# Patient Record
Sex: MALE | Race: WHITE | Employment: FULL TIME | URBAN - METROPOLITAN AREA
[De-identification: names, ages, dates, MRNs, and addresses within clinical notes are randomized per-mention and may not be internally consistent; named-entity substitution may affect disease eponyms.]

---

## 2017-07-07 ENCOUNTER — APPOINTMENT (OUTPATIENT)
Dept: GENERAL RADIOLOGY | Age: 52
End: 2017-07-07
Attending: PHYSICIAN ASSISTANT
Payer: COMMERCIAL

## 2017-07-07 ENCOUNTER — HOSPITAL ENCOUNTER (EMERGENCY)
Age: 52
Discharge: HOME | End: 2017-07-07
Attending: EMERGENCY MEDICINE
Payer: COMMERCIAL

## 2017-07-07 VITALS
OXYGEN SATURATION: 97 % | HEIGHT: 70 IN | DIASTOLIC BLOOD PRESSURE: 81 MMHG | HEART RATE: 76 BPM | SYSTOLIC BLOOD PRESSURE: 111 MMHG | BODY MASS INDEX: 24.34 KG/M2 | TEMPERATURE: 97.4 F | WEIGHT: 170 LBS | RESPIRATION RATE: 15 BRPM

## 2017-07-07 DIAGNOSIS — R55 VASOVAGAL NEAR SYNCOPE: ICD-10-CM

## 2017-07-07 DIAGNOSIS — S92.002A: Primary | ICD-10-CM

## 2017-07-07 PROCEDURE — 96374 THER/PROPH/DIAG INJ IV PUSH: CPT

## 2017-07-07 PROCEDURE — 96361 HYDRATE IV INFUSION ADD-ON: CPT

## 2017-07-07 PROCEDURE — 74011250637 HC RX REV CODE- 250/637: Performed by: PHYSICIAN ASSISTANT

## 2017-07-07 PROCEDURE — 96376 TX/PRO/DX INJ SAME DRUG ADON: CPT

## 2017-07-07 PROCEDURE — 96375 TX/PRO/DX INJ NEW DRUG ADDON: CPT

## 2017-07-07 PROCEDURE — 73650 X-RAY EXAM OF HEEL: CPT

## 2017-07-07 PROCEDURE — 99285 EMERGENCY DEPT VISIT HI MDM: CPT

## 2017-07-07 PROCEDURE — 74011250636 HC RX REV CODE- 250/636: Performed by: PHYSICIAN ASSISTANT

## 2017-07-07 PROCEDURE — 75810000053 HC SPLINT APPLICATION

## 2017-07-07 RX ORDER — OXYCODONE AND ACETAMINOPHEN 5; 325 MG/1; MG/1
1 TABLET ORAL
Status: DISCONTINUED | OUTPATIENT
Start: 2017-07-07 | End: 2017-07-07

## 2017-07-07 RX ORDER — MORPHINE SULFATE 4 MG/ML
4 INJECTION, SOLUTION INTRAMUSCULAR; INTRAVENOUS
Status: COMPLETED | OUTPATIENT
Start: 2017-07-07 | End: 2017-07-07

## 2017-07-07 RX ORDER — SODIUM CHLORIDE 0.9 % (FLUSH) 0.9 %
5-10 SYRINGE (ML) INJECTION EVERY 8 HOURS
Status: DISCONTINUED | OUTPATIENT
Start: 2017-07-07 | End: 2017-07-08 | Stop reason: HOSPADM

## 2017-07-07 RX ORDER — ONDANSETRON 4 MG/1
4 TABLET, ORALLY DISINTEGRATING ORAL
Qty: 10 TAB | Refills: 0 | Status: SHIPPED | OUTPATIENT
Start: 2017-07-07

## 2017-07-07 RX ORDER — SODIUM CHLORIDE 0.9 % (FLUSH) 0.9 %
5-10 SYRINGE (ML) INJECTION AS NEEDED
Status: DISCONTINUED | OUTPATIENT
Start: 2017-07-07 | End: 2017-07-08 | Stop reason: HOSPADM

## 2017-07-07 RX ORDER — OXYCODONE AND ACETAMINOPHEN 5; 325 MG/1; MG/1
1 TABLET ORAL
Qty: 20 TAB | Refills: 0 | Status: SHIPPED | OUTPATIENT
Start: 2017-07-07 | End: 2017-07-12

## 2017-07-07 RX ORDER — KETOROLAC TROMETHAMINE 30 MG/ML
30 INJECTION, SOLUTION INTRAMUSCULAR; INTRAVENOUS
Status: COMPLETED | OUTPATIENT
Start: 2017-07-07 | End: 2017-07-07

## 2017-07-07 RX ORDER — ONDANSETRON 2 MG/ML
4 INJECTION INTRAMUSCULAR; INTRAVENOUS
Status: COMPLETED | OUTPATIENT
Start: 2017-07-07 | End: 2017-07-07

## 2017-07-07 RX ADMIN — MORPHINE SULFATE 4 MG: 4 INJECTION, SOLUTION INTRAMUSCULAR; INTRAVENOUS at 20:15

## 2017-07-07 RX ADMIN — ONDANSETRON 4 MG: 2 INJECTION INTRAMUSCULAR; INTRAVENOUS at 18:36

## 2017-07-07 RX ADMIN — OXYCODONE HYDROCHLORIDE AND ACETAMINOPHEN 1 TABLET: 5; 325 TABLET ORAL at 18:36

## 2017-07-07 RX ADMIN — SODIUM CHLORIDE 1000 ML: 900 INJECTION, SOLUTION INTRAVENOUS at 18:53

## 2017-07-07 RX ADMIN — MORPHINE SULFATE 4 MG: 4 INJECTION, SOLUTION INTRAMUSCULAR; INTRAVENOUS at 18:53

## 2017-07-07 RX ADMIN — KETOROLAC TROMETHAMINE 30 MG: 30 INJECTION, SOLUTION INTRAMUSCULAR at 20:14

## 2017-07-07 RX ADMIN — ONDANSETRON 4 MG: 2 INJECTION INTRAMUSCULAR; INTRAVENOUS at 20:14

## 2017-07-07 NOTE — ED PROVIDER NOTES
HPI Comments: Marylen Baller is a 50 y.o. male presenting ambulatory to the ED with c/o sudden onset of constant left heel pain s/p fall x 30 minutes PTA. Pt states he got back from a camping trip today and was putting camping equipment away when he fell off of a 6 ft ladder. He notes he landed on his left heel onto a concrete floor. Pt reports he has a low tolerance for pain causing him to frequently pass out. Pt and spouse report he has come close several times since the fall to passing out from the pain. Patient denies passing out on top of the ladder, reports he just lost his balance. Pt denies chest pain, SOB, head injury, neck pain, back pain, abdominal pain, numbness, hx of injuring his left ankle, or tobacco use. Pt denies following an orthopedist.     PCP: Alberto Monzon MD      There are no other complaints, changes or physical findings at this time. Written by DARIUS Higuera, as dictated by Kerry New PA-C. The history is provided by the patient. No  was used. No past medical history on file. No past surgical history on file. No family history on file. Social History     Social History    Marital status: N/A     Spouse name: N/A    Number of children: N/A    Years of education: N/A     Occupational History    Not on file. Social History Main Topics    Smoking status: Not on file    Smokeless tobacco: Not on file    Alcohol use Not on file    Drug use: Not on file    Sexual activity: Not on file     Other Topics Concern    Not on file     Social History Narrative         ALLERGIES: Review of patient's allergies indicates no known allergies. Review of Systems   Constitutional: Negative. Negative for chills and fever. HENT: Negative. Negative for rhinorrhea and sore throat. Eyes: Negative. Negative for visual disturbance. Respiratory: Negative. Negative for cough, chest tightness, shortness of breath and wheezing. Cardiovascular: Negative. Negative for chest pain and palpitations. Gastrointestinal: Negative. Negative for abdominal pain, constipation, diarrhea, nausea and vomiting. Genitourinary: Negative. Negative for dysuria and hematuria. Musculoskeletal: Positive for arthralgias (left ankle). Negative for myalgias. Skin: Negative. Negative for rash. Allergic/Immunologic: Negative. Negative for environmental allergies and food allergies. Neurological: Negative. Negative for headaches. Psychiatric/Behavioral: Negative. Negative for suicidal ideas. Patient Vitals for the past 12 hrs:   Temp Pulse Resp BP SpO2   07/07/17 1930 - 63 14 94/76 98 %   07/07/17 1855 - (!) 55 16 114/69 100 %   07/07/17 1800 - 61 17 116/64 96 %   07/07/17 1747 97.4 °F (36.3 °C) (!) 51 16 116/64 96 %            Physical Exam   Constitutional: He is oriented to person, place, and time. He appears well-developed and well-nourished. No distress. Pt is a  M, awake and alert in mild distress secondary to pain. HENT:   Head: Normocephalic and atraumatic. Right Ear: Tympanic membrane, external ear and ear canal normal.   Left Ear: Tympanic membrane, external ear and ear canal normal.   Nose: Nose normal.   Mouth/Throat: Uvula is midline, oropharynx is clear and moist and mucous membranes are normal.   Eyes: Conjunctivae and EOM are normal. Pupils are equal, round, and reactive to light. Right eye exhibits no discharge. Left eye exhibits no discharge. Neck: Normal range of motion. Cardiovascular: Normal rate, normal heart sounds and intact distal pulses. Pulses:       Dorsalis pedis pulses are 2+ on the right side, and 2+ on the left side. Pulmonary/Chest: Effort normal and breath sounds normal. No respiratory distress. He has no wheezes. He has no rales. He exhibits no tenderness. Abdominal: Soft. Bowel sounds are normal. There is no tenderness. There is no guarding. No CVA tenderness b/l. Musculoskeletal: He exhibits tenderness. He exhibits no edema or deformity. Left ankle: Tenderness. L ankle: No edema, erythema, or obvious bony deformity. No TTP. ROM intact. L foot: No edema, erythema, or obvious bony deformity. + TTP over calcaneous. Neuro and sensation intact. 2+ D.P. Pulses b/l. Lymphadenopathy:     He has no cervical adenopathy. Neurological: He is alert and oriented to person, place, and time. No cranial nerve deficit. No focal neuro deficits. Skin: Skin is warm. Laceration noted. No rash noted. He is diaphoretic. No erythema. multiple superficial lacerations to bilateral lower extremities   Psychiatric: He has a normal mood and affect. His behavior is normal.   Nursing note and vitals reviewed. MDM  Number of Diagnoses or Management Options  Closed fracture of left calcaneus, unspecified portion of calcaneus, initial encounter:   Diagnosis management comments:     DDx: fracture, sprain, strain, vasovagal episode secondary to pain    Patient denies low back pain and declines imagining of low back. Amount and/or Complexity of Data Reviewed  Tests in the radiology section of CPT®: ordered and reviewed  Discussion of test results with the performing providers: yes (Ortho)  Discuss the patient with other providers: yes (Orthopedics )    Patient Progress  Patient progress: stable    ED Course       Procedures    Progress Note:  5:59 PM  Pt denies low back pain and declined XR of lumbar spine. Written by DARIUS Rangel, as dictated by Dorothy Roberts PA-C.    CONSULT NOTE:   7:52 PM  Dorothy Roberts PA-C spoke with WANDA Dickey,   Specialty: Ortho  Discussed pt's hx, disposition, and available diagnostic and imaging results. Reviewed care plans. Consultant states to place a well padded posterior splint. Inform the pt to be non weight bearing, ice and elevate his ankle, and follow up with [employer].    Written by DARIUS Rangel, as dictated by Johnny Kuhn PA-C. Procedure Note - Splint Placement:  8:10 PM  Performed by: Johnny Kuhn PA-C  Neurovascularly intact prior to tx. An Orthoglass well padded posterior splint was placed on pt's left ankle. Joint was placed in flexion. Neurovascularly intact after tx. The procedure took 1-15 minutes, and pt tolerated well. Written by Joyce Rucker ED Scribe, as dictated by Johnny Kuhn PA-C.    8:48 PM  Provider re-evaluated pt. Per patient, pain has improved. Pt no longer diaphoretic. Provider discussed all available diagnostics, diagnosis, and treatment plan. Thoroughly discussed worrisome signs/symptoms in which pt should immediately return to ED, otherwise follow up with ortho. Patient conveys understanding and agreement to all of the above. All patient's questions were answered by provider. WANDA Salter      IMAGING RESULTS:  EXAM:  XR CALCANEUS LT     INDICATION:   Trauma x PTA.     COMPARISON: None.     FINDINGS: Lateral and calcaneal views of the left os calcis demonstrate  comminuted fracture. There is mild displacement.     IMPRESSION  IMPRESSION: Comminuted fracture of the left calcaneus.       MEDICATIONS GIVEN:  Medications   sodium chloride (NS) flush 5-10 mL (not administered)   sodium chloride (NS) flush 5-10 mL (not administered)   morphine injection 4 mg (not administered)   ondansetron (ZOFRAN) injection 4 mg (not administered)   sodium chloride 0.9 % bolus infusion 500 mL (not administered)   ketorolac (TORADOL) injection 30 mg (not administered)   morphine injection 4 mg (4 mg IntraVENous Given 7/7/17 1853)   ondansetron (ZOFRAN) injection 4 mg (4 mg IntraVENous Given 7/7/17 1836)   sodium chloride 0.9 % bolus infusion 1,000 mL (1,000 mL IntraVENous New Bag 7/7/17 1853)       IMPRESSION:  1. Closed fracture of left calcaneus, unspecified portion of calcaneus, initial encounter    2. Vasovagal near syncope        PLAN:  1.    Current Discharge Medication List START taking these medications    Details   oxyCODONE-acetaminophen (PERCOCET) 5-325 mg per tablet Take 1 Tab by mouth every four (4) hours as needed for Pain for up to 5 days. Max Daily Amount: 6 Tabs. Qty: 20 Tab, Refills: 0           2. Follow-up Information     Follow up With Details Comments [address] [facility], DO Schedule an appointment as soon as possible for a visit in 2 days  [place] [address]  [phone]      [facility] EMERGENCY DEPT  As needed or, If symptoms worsen [address]  [address] [facility]  [phone]        3. No weight bearing, Rest, elevation, and ice  Return to ED if worse     DISCHARGE NOTE  8:15 PM  The patient has been re-evaluated and is ready for discharge. Reviewed available results with patient. Counseled pt on diagnosis and care plan. Pt has expressed understanding, and all questions have been answered. Pt agrees with plan and agrees to F/U as recommended, or return to the ED if their sxs worsen. Discharge instructions have been provided and explained to the pt, along with reasons to return to the ED. Written by Roro Noe, ED Scribe, as dictated by Walker Duque PA-C. This note is prepared by Roro Noe, acting as Scribe for Walker Duque PA-C. Walker Duque PA-C : The scribe's documentation has been prepared under my direction and personally reviewed by me in its entirety. I confirm that the note above accurately reflects all work, treatment, procedures, and medical decision making performed by me.

## 2017-07-07 NOTE — DISCHARGE INSTRUCTIONS
Broken Foot: Care Instructions  Your Care Instructions    A broken foot, or foot fracture, is a break in one or more of the bones in your foot. It may happen because of a sports injury, a fall, or other accident. A compound, or open, fracture occurs when a bone breaks through the skin. A break that does not poke through the skin is a closed fracture. Your treatment depends on the location and type of break in your foot. You may need a splint, a cast, or an orthopedic shoe. Certain kinds of injuries may need surgery at some time. Whatever your treatment, you can ease symptoms and help your foot heal with care at home. You may need 6 to 8 weeks or more to fully heal.  You heal best when you take good care of yourself. Eat a variety of healthy foods, and don't smoke. Follow-up care is a key part of your treatment and safety. Be sure to make and go to all appointments, and call your doctor if you are having problems. It's also a good idea to know your test results and keep a list of the medicines you take. How can you care for yourself at home? · Be safe with medicines. Take pain medicines exactly as directed. ¨ If the doctor gave you a prescription medicine for pain, take it as prescribed. ¨ If you are not taking a prescription pain medicine, ask your doctor if you can take an over-the-counter medicine. · Leave the splint on until your follow-up appointment. Do not put any weight on the injured foot. If you were given crutches, use them as directed. · Put ice or a cold pack on your foot for 10 to 20 minutes at a time. Try to do this every 1 to 2 hours for the next 3 days (when you are awake) or until the swelling goes down. Put a thin cloth between the ice and your skin. · Prop up the sore foot on a pillow anytime you sit or lie down during the next 3 days. Try to keep it above the level of your heart. This will help reduce swelling. · Follow the cast care instructions your doctor gives you.  If you have a splint, do not take it off unless your doctor tells you to. Cast and splint care  · If you have a removable splint, ask your doctor if it is okay to remove it to bathe. Your doctor may want you to keep it on as much as possible. · Keep your plaster splint covered by taping a sheet of plastic around it when you bathe. Water under the plaster can cause your skin to itch and hurt. · Never cut your splint. When should you call for help? Call 911 anytime you think you may need emergency care. For example, call if:  · You have sudden chest pain and shortness of breath, or you cough up blood. Call your doctor now or seek immediate medical care if:  · You have increased or severe pain. · Your toes are cool or pale or change color. · You have tingling, weakness, or numbness in your foot. · Your cast or splint feels too tight. · You cannot move your toes. · You have signs of a blood clot, such as:  ¨ Pain in your calf, back of the knee, thigh, or groin. ¨ Redness or swelling in your leg or groin. Watch closely for changes in your health, and be sure to contact your doctor if:  · Your pain is not better in 2 to 3 days. Where can you learn more? Go to http://donald-ezequiel.info/. Enter N804 in the search box to learn more about \"Broken Foot: Care Instructions. \"  Current as of: March 21, 2017  Content Version: 11.3  © 2626-6390 [employer]. Care instructions adapted under license by [employer] (which disclaims liability or warranty for this information). If you have questions about a medical condition or this instruction, always ask your healthcare professional. [place] [address] any warranty or liability for your use of this information.

## 2017-07-07 NOTE — ED NOTES
Assumed care of patient from [address] [place] [address]. Patient resting comfortably in no apparent distress. On monitor x 3. Call bell within reach. Plan of care discussed.

## 2017-07-07 NOTE — ED NOTES
While triaging patient states \" I feel like I'm going to pass out. I pass out easily\". Approximately two to three second syncopal episode noted. Patient diaphoretic and alert and oriented after episode.

## 2017-07-07 NOTE — ED NOTES
Patient arrives ambulatory to the ER with wife after falling off a ladder.   Patient c/o L ankle pain

## 2017-07-08 NOTE — ED NOTES
Discharge instructions reviewed with pt and copy given along with RX by WANDA Ceja. All questions answered at this time. Pt discharged via wheelchair.

## 2017-07-10 ENCOUNTER — HOSPITAL ENCOUNTER (OUTPATIENT)
Dept: GENERAL RADIOLOGY | Age: 52
Discharge: HOME | End: 2017-07-10
Attending: PHYSICIAN ASSISTANT
Payer: COMMERCIAL

## 2017-07-10 DIAGNOSIS — F48.9 NO DIAGNOSIS ON AXIS I: ICD-10-CM

## 2017-07-10 PROCEDURE — 73610 X-RAY EXAM OF ANKLE: CPT

## 2018-11-06 ENCOUNTER — HOSPITAL ENCOUNTER (OUTPATIENT)
Dept: CT IMAGING | Age: 53
Discharge: HOME | End: 2018-11-06
Attending: ORTHOPAEDIC SURGERY
Payer: COMMERCIAL

## 2018-11-06 DIAGNOSIS — M77.30: ICD-10-CM

## 2018-11-06 PROCEDURE — 73700 CT LOWER EXTREMITY W/O DYE: CPT
